# Patient Record
Sex: MALE | Race: BLACK OR AFRICAN AMERICAN | NOT HISPANIC OR LATINO | Employment: PART TIME | URBAN - METROPOLITAN AREA
[De-identification: names, ages, dates, MRNs, and addresses within clinical notes are randomized per-mention and may not be internally consistent; named-entity substitution may affect disease eponyms.]

---
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
## 2019-01-07 ENCOUNTER — OFFICE VISIT (OUTPATIENT)
Dept: FAMILY MEDICINE CLINIC | Facility: CLINIC | Age: 19
End: 2019-01-07
Payer: COMMERCIAL

## 2019-01-07 VITALS
HEART RATE: 72 BPM | RESPIRATION RATE: 16 BRPM | WEIGHT: 229 LBS | OXYGEN SATURATION: 97 % | DIASTOLIC BLOOD PRESSURE: 70 MMHG | SYSTOLIC BLOOD PRESSURE: 100 MMHG | HEIGHT: 72 IN | BODY MASS INDEX: 31.02 KG/M2

## 2019-01-07 DIAGNOSIS — Z00.00 ANNUAL PHYSICAL EXAM: ICD-10-CM

## 2019-01-07 DIAGNOSIS — Z76.89 ENCOUNTER TO ESTABLISH CARE: ICD-10-CM

## 2019-01-07 DIAGNOSIS — Z71.82 EXERCISE COUNSELING: ICD-10-CM

## 2019-01-07 DIAGNOSIS — J30.9 ALLERGIC RHINITIS, UNSPECIFIED SEASONALITY, UNSPECIFIED TRIGGER: ICD-10-CM

## 2019-01-07 DIAGNOSIS — J45.20 MILD INTERMITTENT ASTHMA WITHOUT COMPLICATION: ICD-10-CM

## 2019-01-07 DIAGNOSIS — R73.09 ELEVATED HEMOGLOBIN A1C: ICD-10-CM

## 2019-01-07 DIAGNOSIS — E27.40 LOW SERUM CORTISOL LEVEL (HCC): ICD-10-CM

## 2019-01-07 DIAGNOSIS — Z71.3 NUTRITIONAL COUNSELING: ICD-10-CM

## 2019-01-07 DIAGNOSIS — Z00.00 WELL ADULT ON ROUTINE HEALTH CHECK: Primary | ICD-10-CM

## 2019-01-07 PROBLEM — I10 HYPERTENSION: Status: RESOLVED | Noted: 2017-08-15 | Resolved: 2019-01-07

## 2019-01-07 PROBLEM — E66.3 OVERWEIGHT: Status: RESOLVED | Noted: 2017-08-16 | Resolved: 2019-01-07

## 2019-01-07 PROBLEM — E86.0 DEHYDRATION: Status: RESOLVED | Noted: 2017-08-16 | Resolved: 2019-01-07

## 2019-01-07 PROBLEM — R55 SYNCOPE: Status: RESOLVED | Noted: 2017-08-16 | Resolved: 2019-01-07

## 2019-01-07 PROBLEM — B34.9 VIRAL SYNDROME: Status: RESOLVED | Noted: 2017-08-16 | Resolved: 2019-01-07

## 2019-01-07 PROBLEM — J16.0 PNEUMONIA DUE TO CHLAMYDIA SPECIES: Status: RESOLVED | Noted: 2017-08-18 | Resolved: 2019-01-07

## 2019-01-07 PROCEDURE — 99395 PREV VISIT EST AGE 18-39: CPT | Performed by: FAMILY MEDICINE

## 2019-01-07 RX ORDER — ALBUTEROL SULFATE 90 UG/1
2 AEROSOL, METERED RESPIRATORY (INHALATION) EVERY 4 HOURS PRN
Qty: 1 INHALER | Refills: 5 | Status: SHIPPED | OUTPATIENT
Start: 2019-01-07

## 2019-01-07 RX ORDER — MONTELUKAST SODIUM 10 MG/1
10 TABLET ORAL
Qty: 90 TABLET | Refills: 3 | Status: SHIPPED | OUTPATIENT
Start: 2019-01-07

## 2019-01-07 NOTE — PATIENT INSTRUCTIONS

## 2019-01-07 NOTE — ASSESSMENT & PLAN NOTE
There is no clinical indication to repeat his cortisol level at this time  Repeat studies were all normal   Monitor him clinically

## 2019-01-07 NOTE — PROGRESS NOTES
ADULT ANNUAL PHYSICAL  [facility] - [id number] MEDICINE    NAME: Jona Carias  AGE: 22 y o  SEX: male  : 2000     DATE: 2019     Assessment and Plan:     Problem List Items Addressed This Visit        Respiratory    Allergic rhinitis     Currently stable         Mild intermittent asthma without complication     He is a bit of a cough from his recent URI  He is not wheezing  I did prescribe him Dulera as well as Singulair to utilize as needed for prolonged cough or wheezing  He does use Proventil but very rarely  Relevant Medications    albuterol (PROVENTIL HFA,VENTOLIN HFA) 90 mcg/act inhaler    montelukast (SINGULAIR) 10 mg tablet    mometasone-formoterol (DULERA) 100-5 MCG/ACT inhaler       Other    Low serum cortisol level (HCC)     There is no clinical indication to repeat his cortisol level at this time  Repeat studies were all normal   Monitor him clinically  Elevated hemoglobin A1c     Repeat A1c  Relevant Orders    Comprehensive metabolic panel    Hemoglobin A1C    Lipid Panel with Direct LDL reflex      Other Visit Diagnoses     Well adult on routine health check    -  Primary    Encounter to establish care        Exercise counseling        Nutritional counseling        Annual physical exam              Health maintenance and preventative care screenings were discussed with patient today  Appropriate education was printed on patient's after visit summary  · Discussed risks/benefits of screening for high cholesterol and diabetes  Patient is up-to-date with their preventive screenings  · Immunizations were reviewed: patient declines influenza vaccine  Counseling:  Dental Health: discussed importance of regular tooth brushing, flossing, and dental visits  Injury prevention: discussed safety/seat belts, safety helmets, smoke detectors, carbon dioxide detectors, and smoking near bedding or upholstery    Sexual health: discussed sexually transmitted diseases, partner selection, use of condoms, avoidance of unintended pregnancy, and contraceptive alternatives  · Alcohol/drug use: discussed moderation in alcohol intake and avoidance of illicit drug use  No Follow-up on file  Chief Complaint:     Chief Complaint   Patient presents with    Establish Care      History of Present Illness:     Adult Annual Physical   Patient here for a comprehensive physical exam  The patient reports no problems  He reports that 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  He does exercise intermittently on is in excellent shape  He was hospitalized in 2017 with pneumonia at the time he had syncope as well as elevated blood pressure  Cortisol level was also low  This was subsequently repeated and found to be normal   He also had elevated A1c of 6 2  He is not sure if there is a family history of diabetes  He has a history of asthma and allergies  He notes that when he gets sick he does have a prolonged cough  He currently does have a cough due to a recent viral illness  He denies shortness of breath or wheezing  He had significant asthma as a child but seems to be much improved in that department  He does continue to follow with an allergist   He currently has an albuterol inhaler  In the past, he has use Dulera and Singulair  He was treated recently for chlamydia  He notes he has been using condoms more routinely now  He is not currently involved in a relationship but is sexually active with female partners intermittently  He denies any drug use  His cannabis screening did come up positive when he was hospitalized in 2017  He states he does not smoke cannabis at this time  He drinks alcohol rarely and does not drink and drive  He has a seatbelt in the car  Diet and Physical Activity  · Diet/Nutrition: well balanced diet  · Weight concerns: Patient has elevated BMI but he has high lean body mass    · Exercise: vigorous cardiovascular exercise and strength training exercises  Depression Screening  PHQ-9 Depression Screening    PHQ-9:    Frequency of the following problems over the past two weeks:       Little interest or pleasure in doing things:  0 - not at all  Feeling down, depressed, or hopeless:  0 - not at all  PHQ-2 Score:  0       General Health  · Sleep: sleeps well  · Hearing: normal - bilateral   · Vision: no vision problems  · Dental: regular dental visits   Health  · History of STDs?: yes  · Erectile dysfunction: no      Review of Systems:     Review of Systems   Constitutional: Negative for appetite change, chills, fatigue, fever and unexpected weight change  HENT: Negative for trouble swallowing  Eyes: Negative for visual disturbance  Respiratory: Negative for cough, chest tightness, shortness of breath and wheezing  Cardiovascular: Negative for chest pain  Gastrointestinal: Negative for abdominal distention, abdominal pain, blood in stool, constipation and diarrhea  Endocrine: Negative for polyuria  Genitourinary: Negative for difficulty urinating and flank pain  Musculoskeletal: Negative for arthralgias and myalgias  Skin: Negative for rash  Neurological: Negative for dizziness and light-headedness  Hematological: Negative for adenopathy  Does not bruise/bleed easily  Psychiatric/Behavioral: Negative for sleep disturbance        Past Medical History:     Past Medical History:   Diagnosis Date    Allergic     Asthma     Learning difficulty     Osteochondroma     4/15/2014 of Bone-per [employer]      Syncope     2 episodes of syncope the past 2 days      Past Surgical History:     Past Surgical History:   Procedure Laterality Date    OSTEOCHONDROMA EXCISION      OSTEOCHONDROMA EXCISION      removal from leg      Social History:     Social History     Social History    Marital status: Single     Spouse name: N/A    Number of children: N/A    Years of education: N/A Social History Main Topics    Smoking status: Never Smoker    Smokeless tobacco: Never Used    Alcohol use No    Drug use: No    Sexual activity: Yes     Partners: Female     Birth control/ protection: Condom      Comment: [phone]     Other Topics Concern    Not on file     Social History Narrative    No narrative on file      Family History:     Family History   Problem Relation Age of Onset    Asthma Mother     Asthma Father     Eczema Father     Allergies Father     Heart disease Other     Hypertension Other     COPD Maternal Grandmother     Diabetes Maternal Grandmother       Current Medications:     Current Outpatient Prescriptions   Medication Sig Dispense Refill    albuterol (PROVENTIL HFA,VENTOLIN HFA) 90 mcg/act inhaler Inhale 2 puffs every 4 (four) hours as needed for wheezing 1 Inhaler 5    mometasone-formoterol (DULERA) 100-5 MCG/ACT inhaler Inhale 2 puffs 2 (two) times a day 1 Inhaler 5    montelukast (SINGULAIR) 10 mg tablet Take 1 tablet (10 mg total) by mouth daily at bedtime 90 tablet 3     No current facility-administered medications for this visit  Allergies: Allergies   Allergen Reactions    Apple Anaphylaxis     Gets nauseated after eating      Objective:     /70   Pulse 72   Resp 16   Ht 6' (1 829 m)   Wt 104 kg (229 lb)   SpO2 97%   BMI 31 06 kg/m²     Physical Exam   Constitutional: He appears well-developed and well-nourished  No distress  HENT:   Head: Normocephalic  Right Ear: External ear normal    Left Ear: External ear normal    Nose: Nose normal    Mouth/Throat: Oropharynx is clear and moist    Eyes: Pupils are equal, round, and reactive to light  Conjunctivae and EOM are normal  Right eye exhibits no discharge  Left eye exhibits no discharge  Neck: Normal range of motion  No tracheal deviation present  No thyromegaly present  Cardiovascular: Normal heart sounds  Exam reveals no friction rub      No murmur heard   Pulmonary/Chest: Effort normal and breath sounds normal  No respiratory distress  He has no wheezes  He exhibits no tenderness  Abdominal: He exhibits no distension and no mass  There is no tenderness  There is no rebound and no guarding  No hernia  Musculoskeletal: He exhibits no edema or deformity  Neurological: No cranial nerve deficit  Coordination normal    Skin: No rash noted  He is not diaphoretic  No erythema  Psychiatric: He has a normal mood and affect   Thought content normal         Health Maintenance:     Health Maintenance   Topic Date Due    Pneumococcal PPSV23 Medium Risk Adult (1 of 1 - PPSV23) 10/01/2018    INFLUENZA VACCINE  09/03/2019 (Originally 7/1/2018)    Depression Screening PHQ  01/07/2020    DTaP,Tdap,and Td Vaccines (7 - Td) 06/10/2021     Immunization History   Administered Date(s) Administered    DTaP 5 1999, 02/07/2000, 05/04/2000, 05/07/2001, 08/01/2005    HPV Quadrivalent 11/06/2014, 03/13/2015    HPV9 11/11/2015    Hep A, adult 06/05/2008, 05/08/2009    Hep B, adult 1999, 02/07/2000, 05/07/2001    Hib (PRP-OMP) 1999, 02/07/2000, 05/04/2000, 05/07/2001    IPV 2000, 02/07/2000, 05/07/2001, 08/01/2005    Influenza TIV (IM) 10/08/2012, 10/11/2013, 11/06/2014, 11/11/2015    MMR 11/13/2000, 08/01/2005    Meningococcal, Unknown Serogroups 06/10/2011, 11/11/2015    Pneumococcal Conjugate PCV 7 11/13/2000, 05/07/2001    Tdap 06/10/2011    Varicella 11/13/2000, 07/13/2007       MD Chelsey Sánchezview

## 2019-01-07 NOTE — ASSESSMENT & PLAN NOTE
He is a bit of a cough from his recent URI  He is not wheezing  I did prescribe him Dulera as well as Singulair to utilize as needed for prolonged cough or wheezing  He does use Proventil but very rarely

## 2019-02-13 ENCOUNTER — TELEPHONE (OUTPATIENT)
Dept: PSYCHIATRY | Facility: CLINIC | Age: 19
End: 2019-02-13
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
## 2019-02-26 ENCOUNTER — OFFICE VISIT (OUTPATIENT)
Dept: BEHAVIORAL/MENTAL HEALTH CLINIC | Facility: CLINIC | Age: 19
End: 2019-02-26
Payer: COMMERCIAL

## 2019-02-26 DIAGNOSIS — F41.1 GAD (GENERALIZED ANXIETY DISORDER): ICD-10-CM

## 2019-02-26 DIAGNOSIS — F07.81 POST CONCUSSIVE SYNDROME: ICD-10-CM

## 2019-02-26 DIAGNOSIS — F33.2 SEVERE RECURRENT MAJOR DEPRESSION WITHOUT PSYCHOTIC FEATURES (HCC): Primary | ICD-10-CM

## 2019-02-26 DIAGNOSIS — T14.91XA SUICIDAL BEHAVIOR WITH ATTEMPTED SELF-INJURY (HCC): ICD-10-CM

## 2019-02-26 DIAGNOSIS — R45.4 ANGER: ICD-10-CM

## 2019-02-26 DIAGNOSIS — T74.32XD PROBLEM WITH CHILD BEING BULLIED, SUBSEQUENT ENCOUNTER: ICD-10-CM

## 2019-02-26 DIAGNOSIS — Z63.5 FAMILY DISRUPTION DUE TO DIVORCE: ICD-10-CM

## 2019-02-26 PROCEDURE — 90791 PSYCH DIAGNOSTIC EVALUATION: CPT | Performed by: SOCIAL WORKER

## 2019-02-26 SDOH — SOCIAL STABILITY - SOCIAL INSECURITY: DISRUPTION OF FAMILY BY SEPARATION AND DIVORCE: Z63.5

## 2019-02-27 PROBLEM — R45.4 ANGER: Status: ACTIVE | Noted: 2019-02-27

## 2019-02-27 PROBLEM — F07.81 POST CONCUSSIVE SYNDROME: Status: ACTIVE | Noted: 2019-02-27

## 2019-02-27 PROBLEM — F41.1 GAD (GENERALIZED ANXIETY DISORDER): Status: ACTIVE | Noted: 2019-02-27

## 2019-02-27 PROBLEM — F33.2 SEVERE RECURRENT MAJOR DEPRESSION WITHOUT PSYCHOTIC FEATURES (HCC): Status: ACTIVE | Noted: 2019-02-27

## 2019-02-27 PROBLEM — T74.32XA PROBLEM WITH CHILD BEING BULLIED: Status: ACTIVE | Noted: 2019-02-27

## 2019-02-27 PROBLEM — Z63.5 FAMILY DISRUPTION DUE TO DIVORCE: Status: ACTIVE | Noted: 2019-02-27

## 2019-02-27 PROBLEM — T14.91XA SUICIDAL BEHAVIOR WITH ATTEMPTED SELF-INJURY (HCC): Status: ACTIVE | Noted: 2019-02-27
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
## 2019-03-01 ENCOUNTER — TELEPHONE (OUTPATIENT)
Dept: FAMILY MEDICINE CLINIC | Facility: CLINIC | Age: 19
End: 2019-03-01

## 2019-03-01 ENCOUNTER — DOCUMENTATION (OUTPATIENT)
Dept: BEHAVIORAL/MENTAL HEALTH CLINIC | Facility: CLINIC | Age: 19
End: 2019-03-01

## 2019-03-01 ENCOUNTER — TELEPHONE (OUTPATIENT)
Dept: NEUROLOGY | Facility: CLINIC | Age: 19
End: 2019-03-01

## 2019-03-01 DIAGNOSIS — R41.3 SHORT-TERM MEMORY LOSS: Primary | ICD-10-CM
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
## 2019-03-01 NOTE — TELEPHONE ENCOUNTER
Please help arrange a neurology consultation as soon as possible  I had set him up for appointment but he declined to come in CS  I would also be happy to see him in the office here

## 2019-03-04 ENCOUNTER — TRANSCRIBE ORDERS (OUTPATIENT)
Dept: LAB | Facility: CLINIC | Age: 19
End: 2019-03-04

## 2019-03-04 ENCOUNTER — OFFICE VISIT (OUTPATIENT)
Dept: FAMILY MEDICINE CLINIC | Facility: CLINIC | Age: 19
End: 2019-03-04
Payer: COMMERCIAL

## 2019-03-04 ENCOUNTER — APPOINTMENT (OUTPATIENT)
Dept: LAB | Facility: CLINIC | Age: 19
End: 2019-03-04
Payer: COMMERCIAL

## 2019-03-04 VITALS
DIASTOLIC BLOOD PRESSURE: 72 MMHG | HEART RATE: 70 BPM | TEMPERATURE: 97.3 F | RESPIRATION RATE: 18 BRPM | HEIGHT: 72 IN | OXYGEN SATURATION: 98 % | SYSTOLIC BLOOD PRESSURE: 110 MMHG | WEIGHT: 235 LBS | BODY MASS INDEX: 31.83 KG/M2

## 2019-03-04 DIAGNOSIS — R73.09 ELEVATED HEMOGLOBIN A1C: ICD-10-CM

## 2019-03-04 DIAGNOSIS — Z11.3 SCREENING EXAMINATION FOR STD (SEXUALLY TRANSMITTED DISEASE): Primary | ICD-10-CM

## 2019-03-04 DIAGNOSIS — L30.8 OTHER ECZEMA: ICD-10-CM

## 2019-03-04 LAB
ALBUMIN SERPL BCP-MCNC: 4.6 G/DL (ref 3.5–5)
ALP SERPL-CCNC: 80 U/L (ref 46–484)
ALT SERPL W P-5'-P-CCNC: 21 U/L (ref 12–78)
ANION GAP SERPL CALCULATED.3IONS-SCNC: 1 MMOL/L (ref 4–13)
AST SERPL W P-5'-P-CCNC: 14 U/L (ref 5–45)
BILIRUB SERPL-MCNC: 0.68 MG/DL (ref 0.2–1)
BUN SERPL-MCNC: 13 MG/DL (ref 5–25)
CALCIUM SERPL-MCNC: 9 MG/DL (ref 8.3–10.1)
CHLORIDE SERPL-SCNC: 106 MMOL/L (ref 100–108)
CHOLEST SERPL-MCNC: 176 MG/DL (ref 50–200)
CO2 SERPL-SCNC: 30 MMOL/L (ref 21–32)
CREAT SERPL-MCNC: 1.04 MG/DL (ref 0.6–1.3)
EST. AVERAGE GLUCOSE BLD GHB EST-MCNC: 126 MG/DL
GFR SERPL CREATININE-BSD FRML MDRD: 120 ML/MIN/1.73SQ M
GLUCOSE SERPL-MCNC: 93 MG/DL (ref 65–140)
HBA1C MFR BLD: 6 % (ref 4.2–6.3)
HDLC SERPL-MCNC: 50 MG/DL (ref 40–60)
LDLC SERPL CALC-MCNC: 108 MG/DL (ref 0–100)
POTASSIUM SERPL-SCNC: 4.1 MMOL/L (ref 3.5–5.3)
PROT SERPL-MCNC: 7.8 G/DL (ref 6.4–8.2)
SODIUM SERPL-SCNC: 137 MMOL/L (ref 136–145)
TRIGL SERPL-MCNC: 89 MG/DL

## 2019-03-04 PROCEDURE — 83036 HEMOGLOBIN GLYCOSYLATED A1C: CPT

## 2019-03-04 PROCEDURE — 80061 LIPID PANEL: CPT

## 2019-03-04 PROCEDURE — 99213 OFFICE O/P EST LOW 20 MIN: CPT | Performed by: INTERNAL MEDICINE

## 2019-03-04 PROCEDURE — 36415 COLL VENOUS BLD VENIPUNCTURE: CPT

## 2019-03-04 PROCEDURE — 80053 COMPREHEN METABOLIC PANEL: CPT
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
## 2019-03-06 ENCOUNTER — TELEPHONE (OUTPATIENT)
Dept: NEUROLOGY | Facility: CLINIC | Age: 19
End: 2019-03-06

## 2019-03-06 NOTE — TELEPHONE ENCOUNTER
Called susanna rodriguez mom, offered 4/25 appt in [place]  Mother was concerned about the time frame, was hoping we would have a sooner appt  I advised her that CV had sooner availability  She was completely okay with that so I schedule Claudetta Bible on 3/12  Mom was very thankful   Made luis salmeron

## 2019-03-06 NOTE — TELEPHONE ENCOUNTER
Patient went to wrong office for todays appointment we offered to still see him he requested to call him to reschedule, phone issue will call once phone issue resolved [phone]
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
## 2019-03-11 ENCOUNTER — OFFICE VISIT (OUTPATIENT)
Dept: PSYCHIATRY | Facility: CLINIC | Age: 19
End: 2019-03-11
Payer: COMMERCIAL

## 2019-03-11 DIAGNOSIS — F33.2 SEVERE RECURRENT MAJOR DEPRESSION WITHOUT PSYCHOTIC FEATURES (HCC): Primary | ICD-10-CM

## 2019-03-11 PROCEDURE — 90792 PSYCH DIAG EVAL W/MED SRVCS: CPT | Performed by: PSYCHIATRY & NEUROLOGY
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
## 2019-03-12 ENCOUNTER — OFFICE VISIT (OUTPATIENT)
Dept: NEUROLOGY | Facility: CLINIC | Age: 19
End: 2019-03-12
Payer: COMMERCIAL

## 2019-03-12 ENCOUNTER — OFFICE VISIT (OUTPATIENT)
Dept: BEHAVIORAL/MENTAL HEALTH CLINIC | Facility: CLINIC | Age: 19
End: 2019-03-12
Payer: COMMERCIAL

## 2019-03-12 ENCOUNTER — APPOINTMENT (OUTPATIENT)
Dept: LAB | Facility: MEDICAL CENTER | Age: 19
End: 2019-03-12
Payer: COMMERCIAL

## 2019-03-12 VITALS
DIASTOLIC BLOOD PRESSURE: 68 MMHG | SYSTOLIC BLOOD PRESSURE: 112 MMHG | HEART RATE: 71 BPM | WEIGHT: 234 LBS | BODY MASS INDEX: 31.01 KG/M2 | HEIGHT: 73 IN

## 2019-03-12 DIAGNOSIS — T14.91XA SUICIDAL BEHAVIOR WITH ATTEMPTED SELF-INJURY (HCC): ICD-10-CM

## 2019-03-12 DIAGNOSIS — S06.0X1A CONCUSSION WITH LOSS OF CONSCIOUSNESS OF 30 MINUTES OR LESS, INITIAL ENCOUNTER: ICD-10-CM

## 2019-03-12 DIAGNOSIS — F33.2 SEVERE RECURRENT MAJOR DEPRESSION WITHOUT PSYCHOTIC FEATURES (HCC): Primary | ICD-10-CM

## 2019-03-12 DIAGNOSIS — Z86.59 HX OF MAJOR DEPRESSION: ICD-10-CM

## 2019-03-12 DIAGNOSIS — T74.32XD PROBLEM WITH CHILD BEING BULLIED, SUBSEQUENT ENCOUNTER: ICD-10-CM

## 2019-03-12 DIAGNOSIS — R45.4 ANGER: ICD-10-CM

## 2019-03-12 DIAGNOSIS — R40.4 TRANSIENT ALTERATION OF AWARENESS: ICD-10-CM

## 2019-03-12 DIAGNOSIS — Z63.5 FAMILY DISRUPTION DUE TO DIVORCE: ICD-10-CM

## 2019-03-12 DIAGNOSIS — S06.0X1A CONCUSSION WITH LOSS OF CONSCIOUSNESS OF 30 MINUTES OR LESS, INITIAL ENCOUNTER: Primary | ICD-10-CM

## 2019-03-12 DIAGNOSIS — Z11.3 SCREENING EXAMINATION FOR STD (SEXUALLY TRANSMITTED DISEASE): ICD-10-CM

## 2019-03-12 DIAGNOSIS — Z11.3 SCREEN FOR STD (SEXUALLY TRANSMITTED DISEASE): ICD-10-CM

## 2019-03-12 DIAGNOSIS — R41.840 CONCENTRATION DEFICIT: ICD-10-CM

## 2019-03-12 DIAGNOSIS — F41.1 GAD (GENERALIZED ANXIETY DISORDER): ICD-10-CM

## 2019-03-12 DIAGNOSIS — F07.81 POST CONCUSSIVE SYNDROME: ICD-10-CM

## 2019-03-12 LAB
BASOPHILS # BLD AUTO: 0.03 THOUSANDS/ΜL (ref 0–0.1)
BASOPHILS NFR BLD AUTO: 1 % (ref 0–1)
EOSINOPHIL # BLD AUTO: 0.17 THOUSAND/ΜL (ref 0–0.61)
EOSINOPHIL NFR BLD AUTO: 3 % (ref 0–6)
ERYTHROCYTE [DISTWIDTH] IN BLOOD BY AUTOMATED COUNT: 17.3 % (ref 11.6–15.1)
HCT VFR BLD AUTO: 48.5 % (ref 36.5–49.3)
HGB BLD-MCNC: 14.8 G/DL (ref 12–17)
IMM GRANULOCYTES # BLD AUTO: 0.02 THOUSAND/UL (ref 0–0.2)
IMM GRANULOCYTES NFR BLD AUTO: 0 % (ref 0–2)
LYMPHOCYTES # BLD AUTO: 1.9 THOUSANDS/ΜL (ref 0.6–4.47)
LYMPHOCYTES NFR BLD AUTO: 29 % (ref 14–44)
MAGNESIUM SERPL-MCNC: 2.3 MG/DL (ref 1.6–2.6)
MCH RBC QN AUTO: 22.6 PG (ref 26.8–34.3)
MCHC RBC AUTO-ENTMCNC: 30.5 G/DL (ref 31.4–37.4)
MCV RBC AUTO: 74 FL (ref 82–98)
MONOCYTES # BLD AUTO: 0.46 THOUSAND/ΜL (ref 0.17–1.22)
MONOCYTES NFR BLD AUTO: 7 % (ref 4–12)
NEUTROPHILS # BLD AUTO: 3.93 THOUSANDS/ΜL (ref 1.85–7.62)
NEUTS SEG NFR BLD AUTO: 60 % (ref 43–75)
NRBC BLD AUTO-RTO: 0 /100 WBCS
PLATELET # BLD AUTO: 347 THOUSANDS/UL (ref 149–390)
PMV BLD AUTO: 10.7 FL (ref 8.9–12.7)
RBC # BLD AUTO: 6.56 MILLION/UL (ref 3.88–5.62)
TSH SERPL DL<=0.05 MIU/L-ACNC: 0.87 UIU/ML (ref 0.46–3.98)
WBC # BLD AUTO: 6.51 THOUSAND/UL (ref 4.31–10.16)

## 2019-03-12 PROCEDURE — 99245 OFF/OP CONSLTJ NEW/EST HI 55: CPT | Performed by: PSYCHIATRY & NEUROLOGY

## 2019-03-12 PROCEDURE — 83735 ASSAY OF MAGNESIUM: CPT

## 2019-03-12 PROCEDURE — 87591 N.GONORRHOEAE DNA AMP PROB: CPT

## 2019-03-12 PROCEDURE — 87389 HIV-1 AG W/HIV-1&-2 AB AG IA: CPT

## 2019-03-12 PROCEDURE — 86592 SYPHILIS TEST NON-TREP QUAL: CPT

## 2019-03-12 PROCEDURE — 36415 COLL VENOUS BLD VENIPUNCTURE: CPT

## 2019-03-12 PROCEDURE — 87491 CHLMYD TRACH DNA AMP PROBE: CPT

## 2019-03-12 PROCEDURE — 84443 ASSAY THYROID STIM HORMONE: CPT

## 2019-03-12 PROCEDURE — 90834 PSYTX W PT 45 MINUTES: CPT | Performed by: SOCIAL WORKER

## 2019-03-12 PROCEDURE — 85025 COMPLETE CBC W/AUTO DIFF WBC: CPT

## 2019-03-12 SDOH — SOCIAL STABILITY - SOCIAL INSECURITY: DISRUPTION OF FAMILY BY SEPARATION AND DIVORCE: Z63.5
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
## 2019-03-12 NOTE — PROGRESS NOTES
Review of Systems   Constitutional: Negative  Negative for appetite change and fever  HENT: Negative  Negative for hearing loss, tinnitus, trouble swallowing and voice change  Eyes: Negative  Negative for photophobia and pain  Respiratory: Negative  Negative for shortness of breath  Cardiovascular: Negative  Negative for palpitations  Gastrointestinal: Negative  Negative for nausea  Endocrine: Negative  Negative for cold intolerance and heat intolerance  Genitourinary: Negative  Negative for dysuria, frequency and urgency  Musculoskeletal: Positive for back pain  Negative for myalgias and neck pain  Skin: Negative  Allergic/Immunologic: Negative  Neurological: Negative  Negative for dizziness, tremors, seizures, syncope, facial asymmetry, speech difficulty, weakness and numbness  Hematological: Negative  Does not bruise/bleed easily  Psychiatric/Behavioral: Negative  Negative for confusion and hallucinations

## 2019-03-13 LAB
C TRACH DNA SPEC QL NAA+PROBE: NEGATIVE
HIV 1+2 AB+HIV1 P24 AG SERPL QL IA: NORMAL
N GONORRHOEA DNA SPEC QL NAA+PROBE: NEGATIVE
RPR SER QL: NORMAL

## 2019-03-13 NOTE — PSYCH
Psychotherapy Provided: Individual Psychotherapy 45 minutes     Length of time in session: 45 minutes, follow up in 2 week    Goals addressed in session: Goal 1, Goal 2 and Goal 3      Pain:      moderate to severe    0    Current suicide risk : Low     Data:vinny arrived for his session today  He discussed after talking with his mother he will be moving down to [place] to one of his mother's brothers his uncle  He is going to attend a technical school down there  He discussed he no longer has thoughts of hurting himself and has no self harm thoughts any longer  He said he also has a uncle on his dad's side who lives in [place] and who he keeps in touch with  He discussed he is still friends with the girl he broke up with and he is keeping his options open to see new people in [place]  He has also met new girls on the internet  Regarding his goal he denied his depression, suicidal ideation and now that he has a plan his anxiety is decreased  He also said he talks with his dad in [facility]  He claims he has let go of the anger  Assessment: David Chance is sharing he is in a better space emotionally and is looking forward to living with his uncle  He dislikes [place] and is glad to move on  Plan: He shared the 2 times we have met have offered him new insights about his future and how important his life is and that he wants to be there for his younger brother  He will see the undersigned once more before he moves  Behavioral Health Treatment Plan [facility]: Diagnosis and Treatment Plan explained to So Cabrera relates understanding diagnosis and is agreeable to Treatment Plan   Yes

## 2019-03-18 ENCOUNTER — HOSPITAL ENCOUNTER (OUTPATIENT)
Dept: MRI IMAGING | Facility: HOSPITAL | Age: 19
Discharge: HOME | End: 2019-03-18
Attending: PSYCHIATRY & NEUROLOGY
Payer: COMMERCIAL

## 2019-03-18 ENCOUNTER — OFFICE VISIT (OUTPATIENT)
Dept: BEHAVIORAL/MENTAL HEALTH CLINIC | Facility: CLINIC | Age: 19
End: 2019-03-18
Payer: COMMERCIAL

## 2019-03-18 DIAGNOSIS — S06.0X1A CONCUSSION WITH LOSS OF CONSCIOUSNESS OF 30 MINUTES OR LESS, INITIAL ENCOUNTER: ICD-10-CM

## 2019-03-18 DIAGNOSIS — R45.4 ANGER: ICD-10-CM

## 2019-03-18 DIAGNOSIS — Z63.5 FAMILY DISRUPTION DUE TO DIVORCE: ICD-10-CM

## 2019-03-18 DIAGNOSIS — F33.2 SEVERE RECURRENT MAJOR DEPRESSION WITHOUT PSYCHOTIC FEATURES (HCC): Primary | ICD-10-CM

## 2019-03-18 DIAGNOSIS — F41.1 GAD (GENERALIZED ANXIETY DISORDER): ICD-10-CM

## 2019-03-18 DIAGNOSIS — T14.91XA SUICIDAL BEHAVIOR WITH ATTEMPTED SELF-INJURY (HCC): ICD-10-CM

## 2019-03-18 DIAGNOSIS — F07.81 POST CONCUSSIVE SYNDROME: ICD-10-CM

## 2019-03-18 DIAGNOSIS — T74.32XD PROBLEM WITH CHILD BEING BULLIED, SUBSEQUENT ENCOUNTER: ICD-10-CM

## 2019-03-18 PROCEDURE — 70553 MRI BRAIN STEM W/O & W/DYE: CPT

## 2019-03-18 PROCEDURE — 90834 PSYTX W PT 45 MINUTES: CPT | Performed by: SOCIAL WORKER

## 2019-03-18 PROCEDURE — A9585 GADOBUTROL INJECTION: HCPCS | Performed by: PSYCHIATRY & NEUROLOGY

## 2019-03-18 RX ADMIN — GADOBUTROL 10 ML: 604.72 INJECTION INTRAVENOUS at 14:27

## 2019-03-18 SDOH — SOCIAL STABILITY - SOCIAL INSECURITY: DISRUPTION OF FAMILY BY SEPARATION AND DIVORCE: Z63.5

## 2019-03-18 NOTE — PSYCH
Psychotherapy Provided: Individual Psychotherapy 45 minutes     Length of time in session: 45 minutes, follow up in 2 week    Goals addressed in session: Goal 1, Goal 2 and Goal 3      Pain:      moderate to severe    0    Current suicide risk : Low     Data:Neto shared he will be moving in a few days and he is glad he is moving with his uncle  He shared it seems his girlfriend is now even more interested now that she realizes he is moving on  He realizes he may meet new people down in [place] and he wants to move on  However he shared over time we may find out we are meant for each other but shared he is now excited he may meet new girls in [place]  He discussed his school plans and talked about how he is excited about his future  Assessment: Arnulfo Krueger shared he was glad he could talk this out and thanked the undersigned for helping him to get thru this  He shared he will seek out support down there if he ever goes thru this again  Plan: He will be leaving in a few days  Behavioral Health Treatment Plan [facility]: Diagnosis and Treatment Plan explained to Antonio Watson relates understanding diagnosis and is agreeable to Treatment Plan   Yes

## 2019-03-20 ENCOUNTER — TELEPHONE (OUTPATIENT)
Dept: NEUROLOGY | Facility: CLINIC | Age: 19
End: 2019-03-20

## 2019-03-20 NOTE — TELEPHONE ENCOUNTER
Notes recorded by Mikey Gallegos MA on 3/20/2019 at 1:22 PM EDT  Look in patient's chart and his mother scheduled his EEG for tomorrow for 1:00 pm

## 2019-03-20 NOTE — TELEPHONE ENCOUNTER
----- Message from Kingston Castillo MA sent at 3/20/2019 12:14 PM EDT -----  Called and spoke to patient and explained to patient that Dr Elsie Lennon reviewed MRI brain images and the report and that there is no scarring or brusing or bleeding present and the he should proceed with his EEG and patient stated that he understood and   patient stated that his mom would call to schedule his EEG and if they had any questions that they should call us back